# Patient Record
Sex: FEMALE | Race: WHITE | HISPANIC OR LATINO | Employment: OTHER | URBAN - METROPOLITAN AREA
[De-identification: names, ages, dates, MRNs, and addresses within clinical notes are randomized per-mention and may not be internally consistent; named-entity substitution may affect disease eponyms.]

---

## 2019-06-27 ENCOUNTER — OFFICE VISIT (OUTPATIENT)
Dept: PODIATRY | Facility: CLINIC | Age: 84
End: 2019-06-27
Payer: MEDICARE

## 2019-06-27 VITALS
BODY MASS INDEX: 20.66 KG/M2 | WEIGHT: 121 LBS | SYSTOLIC BLOOD PRESSURE: 126 MMHG | DIASTOLIC BLOOD PRESSURE: 74 MMHG | HEART RATE: 83 BPM | HEIGHT: 64 IN

## 2019-06-27 DIAGNOSIS — L84 CORNS: ICD-10-CM

## 2019-06-27 DIAGNOSIS — I73.9 PERIPHERAL VASCULAR DISEASE, UNSPECIFIED (HCC): Primary | ICD-10-CM

## 2019-06-27 PROCEDURE — 11719 TRIM NAIL(S) ANY NUMBER: CPT | Performed by: PODIATRIST

## 2019-06-27 PROCEDURE — 11056 PARNG/CUTG B9 HYPRKR LES 2-4: CPT | Performed by: PODIATRIST

## 2019-06-27 PROCEDURE — RECHECK: Performed by: PODIATRIST

## 2019-10-05 PROBLEM — T14.8XXA SKIN AVULSION: Status: ACTIVE | Noted: 2019-10-05

## 2019-10-18 PROBLEM — T14.8XXA SKIN AVULSION: Status: RESOLVED | Noted: 2019-10-05 | Resolved: 2019-10-18

## 2020-01-10 ENCOUNTER — HOSPITAL ENCOUNTER (EMERGENCY)
Facility: HOSPITAL | Age: 85
Discharge: HOME | End: 2020-01-10
Attending: EMERGENCY MEDICINE | Admitting: EMERGENCY MEDICINE
Payer: MEDICARE

## 2020-01-10 ENCOUNTER — APPOINTMENT (EMERGENCY)
Dept: CT IMAGING | Facility: HOSPITAL | Age: 85
End: 2020-01-10
Payer: MEDICARE

## 2020-01-10 VITALS
SYSTOLIC BLOOD PRESSURE: 126 MMHG | DIASTOLIC BLOOD PRESSURE: 60 MMHG | HEIGHT: 64 IN | HEART RATE: 62 BPM | BODY MASS INDEX: 20.1 KG/M2 | RESPIRATION RATE: 20 BRPM | TEMPERATURE: 98.3 F | WEIGHT: 117.73 LBS | OXYGEN SATURATION: 95 %

## 2020-01-10 DIAGNOSIS — R10.84 GENERALIZED ABDOMINAL PAIN: Primary | ICD-10-CM

## 2020-01-10 DIAGNOSIS — J90 PLEURAL EFFUSION: ICD-10-CM

## 2020-01-10 DIAGNOSIS — R91.8 MASS OF RIGHT LUNG: ICD-10-CM

## 2020-01-10 LAB
ALBUMIN SERPL BCP-MCNC: 2.4 G/DL (ref 3.5–5)
ALP SERPL-CCNC: 98 U/L (ref 46–116)
ALT SERPL W P-5'-P-CCNC: 10 U/L (ref 12–78)
ANION GAP SERPL CALCULATED.3IONS-SCNC: 8 MMOL/L (ref 4–13)
AST SERPL W P-5'-P-CCNC: 42 U/L (ref 5–45)
BACTERIA UR QL AUTO: ABNORMAL /HPF
BASOPHILS # BLD AUTO: 0.03 THOUSANDS/ΜL (ref 0–0.1)
BASOPHILS NFR BLD AUTO: 0 % (ref 0–1)
BILIRUB SERPL-MCNC: 0.54 MG/DL (ref 0.2–1)
BILIRUB UR QL STRIP: NEGATIVE
BUN SERPL-MCNC: 10 MG/DL (ref 5–25)
CALCIUM SERPL-MCNC: 8.2 MG/DL (ref 8.3–10.1)
CHLORIDE SERPL-SCNC: 96 MMOL/L (ref 100–108)
CLARITY UR: CLEAR
CO2 SERPL-SCNC: 25 MMOL/L (ref 21–32)
COLOR UR: YELLOW
CREAT SERPL-MCNC: 0.64 MG/DL (ref 0.6–1.3)
EOSINOPHIL # BLD AUTO: 0.03 THOUSAND/ΜL (ref 0–0.61)
EOSINOPHIL NFR BLD AUTO: 0 % (ref 0–6)
ERYTHROCYTE [DISTWIDTH] IN BLOOD BY AUTOMATED COUNT: 12.5 % (ref 11.6–15.1)
GFR SERPL CREATININE-BSD FRML MDRD: 73 ML/MIN/1.73SQ M
GLUCOSE SERPL-MCNC: 109 MG/DL (ref 65–140)
GLUCOSE UR STRIP-MCNC: NEGATIVE MG/DL
HCT VFR BLD AUTO: 31.8 % (ref 34.8–46.1)
HGB BLD-MCNC: 10.7 G/DL (ref 11.5–15.4)
HGB UR QL STRIP.AUTO: NEGATIVE
IMM GRANULOCYTES # BLD AUTO: 0.04 THOUSAND/UL (ref 0–0.2)
IMM GRANULOCYTES NFR BLD AUTO: 0 % (ref 0–2)
KETONES UR STRIP-MCNC: NEGATIVE MG/DL
LEUKOCYTE ESTERASE UR QL STRIP: ABNORMAL
LIPASE SERPL-CCNC: 89 U/L (ref 73–393)
LYMPHOCYTES # BLD AUTO: 1.73 THOUSANDS/ΜL (ref 0.6–4.47)
LYMPHOCYTES NFR BLD AUTO: 18 % (ref 14–44)
MCH RBC QN AUTO: 30.8 PG (ref 26.8–34.3)
MCHC RBC AUTO-ENTMCNC: 33.6 G/DL (ref 31.4–37.4)
MCV RBC AUTO: 92 FL (ref 82–98)
MONOCYTES # BLD AUTO: 1.31 THOUSAND/ΜL (ref 0.17–1.22)
MONOCYTES NFR BLD AUTO: 13 % (ref 4–12)
NEUTROPHILS # BLD AUTO: 6.7 THOUSANDS/ΜL (ref 1.85–7.62)
NEUTS SEG NFR BLD AUTO: 69 % (ref 43–75)
NITRITE UR QL STRIP: NEGATIVE
NON-SQ EPI CELLS URNS QL MICRO: ABNORMAL /HPF
NRBC BLD AUTO-RTO: 0 /100 WBCS
PH UR STRIP.AUTO: 6 [PH]
PLATELET # BLD AUTO: 350 THOUSANDS/UL (ref 149–390)
PMV BLD AUTO: 9.3 FL (ref 8.9–12.7)
POTASSIUM SERPL-SCNC: 4 MMOL/L (ref 3.5–5.3)
PROT SERPL-MCNC: 7 G/DL (ref 6.4–8.2)
PROT UR STRIP-MCNC: NEGATIVE MG/DL
RBC # BLD AUTO: 3.47 MILLION/UL (ref 3.81–5.12)
RBC #/AREA URNS AUTO: ABNORMAL /HPF
SODIUM SERPL-SCNC: 129 MMOL/L (ref 136–145)
SP GR UR STRIP.AUTO: <=1.005 (ref 1–1.03)
UROBILINOGEN UR QL STRIP.AUTO: 0.2 E.U./DL
WBC # BLD AUTO: 9.84 THOUSAND/UL (ref 4.31–10.16)
WBC #/AREA URNS AUTO: ABNORMAL /HPF

## 2020-01-10 PROCEDURE — 85025 COMPLETE CBC W/AUTO DIFF WBC: CPT | Performed by: EMERGENCY MEDICINE

## 2020-01-10 PROCEDURE — 74177 CT ABD & PELVIS W/CONTRAST: CPT

## 2020-01-10 PROCEDURE — 81001 URINALYSIS AUTO W/SCOPE: CPT | Performed by: EMERGENCY MEDICINE

## 2020-01-10 PROCEDURE — 99284 EMERGENCY DEPT VISIT MOD MDM: CPT | Performed by: EMERGENCY MEDICINE

## 2020-01-10 PROCEDURE — 80053 COMPREHEN METABOLIC PANEL: CPT | Performed by: EMERGENCY MEDICINE

## 2020-01-10 PROCEDURE — 36415 COLL VENOUS BLD VENIPUNCTURE: CPT | Performed by: EMERGENCY MEDICINE

## 2020-01-10 PROCEDURE — 99284 EMERGENCY DEPT VISIT MOD MDM: CPT

## 2020-01-10 PROCEDURE — 83690 ASSAY OF LIPASE: CPT | Performed by: EMERGENCY MEDICINE

## 2020-01-10 RX ORDER — DICYCLOMINE HCL 20 MG
20 TABLET ORAL ONCE
Status: COMPLETED | OUTPATIENT
Start: 2020-01-10 | End: 2020-01-10

## 2020-01-10 RX ORDER — DICYCLOMINE HCL 20 MG
20 TABLET ORAL 2 TIMES DAILY PRN
Qty: 20 TABLET | Refills: 0 | Status: SHIPPED | OUTPATIENT
Start: 2020-01-10

## 2020-01-10 RX ADMIN — IODIXANOL 85 ML: 320 INJECTION, SOLUTION INTRAVASCULAR at 11:14

## 2020-01-10 RX ADMIN — DICYCLOMINE HYDROCHLORIDE 20 MG: 20 TABLET ORAL at 13:24

## 2020-01-10 NOTE — ED PROVIDER NOTES
History  Chief Complaint   Patient presents with    Abdominal Pain     Diffuse abd pain with reports of constipation     Patient is a 27-year-old female with a history of hypertension who presents with abdominal pain  Patient states that she has had constipation for the past week  She has been seen at [facility] emergency department multiple times for similar complaints  She was hospitalized after 1 of these visits for hyponatremia  She has had small, infrequent bowel movements since her visits  She has been taking MiraLax and had received multiple enemas in the emergency department  This morning, she complained of worsened generalized abdominal pain  She told staff at her living facility to arrange transport to the emergency department  Patient's last bowel movement was yesterday and she did experience relief of her abdominal pain after this bowel movement  She denies fever, chills, nausea, vomiting or other complaints  History provided by:  Patient  Abdominal Pain   Pain location:  Generalized  Pain radiates to:  Does not radiate  Pain severity:  Moderate  Duration:  1 week  Timing:  Intermittent  Progression:  Worsening  Chronicity:  New  Ineffective treatments: Bowel activity  Associated symptoms: constipation and flatus    Associated symptoms: no chest pain, no chills, no cough, no diarrhea, no dysuria, no fever, no hematuria, no nausea, no shortness of breath, no sore throat, no vaginal bleeding, no vaginal discharge and no vomiting        Prior to Admission Medications   Prescriptions Last Dose Informant Patient Reported? Taking?    BYSTOLIC 10 MG tablet   Yes No   Sig: TAKE 0 5 TABLET BY ORAL ROUTE EVERY DAY   COMBIGAN 0 2-0 5 %   Yes No   Sig: INSERT 1 DROP OPHTHALMICALLY TWICE A DAY INTO BOTH EYES , BRAND NECESSARY   Ciclopirox 1 % shampoo   Yes No   Sig: WASH TWICE WEEKLY   amLODIPine (NORVASC) 2 5 mg tablet   Yes No   Sig: Take 2 5 mg by mouth daily   aspirin 81 mg chewable tablet   Yes No   Sig: Chew 81 mg   erythromycin (ILOTYCIN) ophthalmic ointment   Yes No   Sig: APPLY A SMALL AMOUNT ON EYELID THREE TIMES A DAY AS DIRECTED   levothyroxine 88 mcg tablet   Yes No   Sig: Take 88 mcg by mouth daily      Facility-Administered Medications: None       Past Medical History:   Diagnosis Date    Cataract     Disease of thyroid gland     Glaucoma     HL (hearing loss)     Wears hearing aids    Hypertension     PVD (peripheral vascular disease) (HCC)     Tinnitus        Past Surgical History:   Procedure Laterality Date    ADENOIDECTOMY      APPENDECTOMY      CATARACT EXTRACTION      CORRECTION HAMMER TOE Left     3rd toe    HYSTERECTOMY      THYROIDECTOMY      TONSILLECTOMY         Family History   Problem Relation Age of Onset    Diabetes Family     Heart disease Family      I have reviewed and agree with the history as documented  Social History     Tobacco Use    Smoking status: Former Smoker    Smokeless tobacco: Never Used   Substance Use Topics    Alcohol use: Yes     Frequency: Monthly or less    Drug use: Never        Review of Systems   Constitutional: Negative for chills, diaphoresis and fever  HENT: Negative for nosebleeds, sore throat and trouble swallowing  Eyes: Negative for photophobia, pain and visual disturbance  Respiratory: Negative for cough, chest tightness and shortness of breath  Cardiovascular: Negative for chest pain, palpitations and leg swelling  Gastrointestinal: Positive for abdominal pain, constipation and flatus  Negative for diarrhea, nausea and vomiting  Endocrine: Negative for polydipsia and polyuria  Genitourinary: Negative for difficulty urinating, dysuria, hematuria, pelvic pain, vaginal bleeding and vaginal discharge  Musculoskeletal: Negative for back pain, neck pain and neck stiffness  Skin: Negative for pallor and rash  Neurological: Negative for dizziness, seizures, light-headedness and headaches     All other systems reviewed and are negative  Physical Exam  Physical Exam   Constitutional: She is oriented to person, place, and time  She appears well-developed and well-nourished  No distress  HENT:   Head: Normocephalic and atraumatic  Mouth/Throat: Oropharynx is clear and moist  Mucous membranes are dry  Eyes: Pupils are equal, round, and reactive to light  EOM are normal    Neck: Normal range of motion  Neck supple  Cardiovascular: Normal rate, regular rhythm, normal heart sounds, intact distal pulses and normal pulses  Pulmonary/Chest: Effort normal and breath sounds normal  No respiratory distress  Abdominal: Soft  She exhibits no distension  There is generalized tenderness  There is no rigidity, no rebound and no guarding  Musculoskeletal: Normal range of motion  She exhibits no edema or tenderness  Lymphadenopathy:     She has no cervical adenopathy  Neurological: She is alert and oriented to person, place, and time  She has normal strength  No cranial nerve deficit or sensory deficit  Skin: Skin is warm and dry  Capillary refill takes less than 2 seconds  Psychiatric: She has a normal mood and affect  Nursing note and vitals reviewed        Vital Signs  ED Triage Vitals [01/10/20 0943]   Temperature Pulse Respirations Blood Pressure SpO2   98 3 °F (36 8 °C) 68 20 (!) 172/72 96 %      Temp Source Heart Rate Source Patient Position - Orthostatic VS BP Location FiO2 (%)   Oral Monitor Sitting Right arm --      Pain Score       Worst Possible Pain           Vitals:    01/10/20 0943 01/10/20 1239   BP: (!) 172/72 126/60   Pulse: 68 62   Patient Position - Orthostatic VS: Sitting Lying         Visual Acuity      ED Medications  Medications   iodixanol (VISIPAQUE) 320 MG/ML injection 85 mL (85 mL Intravenous Given 1/10/20 1114)   dicyclomine (BENTYL) tablet 20 mg (20 mg Oral Given 1/10/20 1324)       Diagnostic Studies  Results Reviewed     Procedure Component Value Units Date/Time    Urine Microscopic [[id number]]  (Abnormal) Collected:  01/10/20 1151    Lab Status:  Final result Specimen:  Urine, Other Updated:  01/10/20 1246     RBC, UA 0-1 /hpf      WBC, UA 1-2 /hpf      Epithelial Cells Occasional /hpf      Bacteria, UA Occasional /hpf     UA (URINE) with reflex to Scope [[id number]]  (Abnormal) Collected:  01/10/20 1151    Lab Status:  Final result Specimen:  Urine, Other Updated:  01/10/20 1206     Color, UA Yellow     Clarity, UA Clear     Specific Gravity, UA <=1 005     pH, UA 6 0     Leukocytes, UA Small     Nitrite, UA Negative     Protein, UA Negative mg/dl      Glucose, UA Negative mg/dl      Ketones, UA Negative mg/dl      Urobilinogen, UA 0 2 E U /dl      Bilirubin, UA Negative     Blood, UA Negative    CMP [[id number]]  (Abnormal) Collected:  01/10/20 1024    Lab Status:  Final result Specimen:  Blood from Arm, Right Updated:  01/10/20 1059     Sodium 129 mmol/L      Potassium 4 0 mmol/L      Chloride 96 mmol/L      CO2 25 mmol/L      ANION GAP 8 mmol/L      BUN 10 mg/dL      Creatinine 0 64 mg/dL      Glucose 109 mg/dL      Calcium 8 2 mg/dL      AST 42 U/L      ALT 10 U/L      Alkaline Phosphatase 98 U/L      Total Protein 7 0 g/dL      Albumin 2 4 g/dL      Total Bilirubin 0 54 mg/dL      eGFR 73 ml/min/1 73sq m     Narrative:       [place] guidelines for Chronic Kidney Disease (CKD):     Stage 1 with normal or high GFR (GFR > 90 mL/min/1 73 square meters)    Stage 2 Mild CKD (GFR = 60-89 mL/min/1 73 square meters)    Stage 3A Moderate CKD (GFR = 45-59 mL/min/1 73 square meters)    Stage 3B Moderate CKD (GFR = 30-44 mL/min/1 73 square meters)    Stage 4 Severe CKD (GFR = 15-29 mL/min/1 73 square meters)    Stage 5 End Stage CKD (GFR <15 mL/min/1 73 square meters)  Note: GFR calculation is accurate only with a steady state creatinine    Lipase [[id number]]  (Normal) Collected:  01/10/20 1024    Lab Status:  Final result Specimen:  Blood from Arm, Right Updated: 01/10/20 1059     Lipase 89 u/L     CBC and differential [[id number]]  (Abnormal) Collected:  01/10/20 1024    Lab Status:  Final result Specimen:  Blood from Arm, Right Updated:  01/10/20 1032     WBC 9 84 Thousand/uL      RBC 3 47 Million/uL      Hemoglobin 10 7 g/dL      Hematocrit 31 8 %      MCV 92 fL      MCH 30 8 pg      MCHC 33 6 g/dL      RDW 12 5 %      MPV 9 3 fL      Platelets 645 Thousands/uL      nRBC 0 /100 WBCs      Neutrophils Relative 69 %      Immat GRANS % 0 %      Lymphocytes Relative 18 %      Monocytes Relative 13 %      Eosinophils Relative 0 %      Basophils Relative 0 %      Neutrophils Absolute 6 70 Thousands/µL      Immature Grans Absolute 0 04 Thousand/uL      Lymphocytes Absolute 1 73 Thousands/µL      Monocytes Absolute 1 31 Thousand/µL      Eosinophils Absolute 0 03 Thousand/µL      Basophils Absolute 0 03 Thousands/µL                  CT abdomen pelvis with contrast   Final Result by Rachel Julio MD (01/10 1201)   1  No acute findings in the abdomen or pelvis  2   Small right pleural effusion  Trace left pleural effusion  3  Focal rounded consolidation in the right middle lobe inferiorly  Question rounded atelectasis or pneumonia, pulmonary lesion is not excluded  Based on current Fleischner Society 2017 Guidelines on incidental pulmonary nodule, either PET/CT scan    evaluation, tissue sampling or short term interval followup non-contrast CT followup (initially in 3 months) may be considered appropriate  4   Moderate anterior wedging at T11 of indeterminate age  The study was marked in EPIC for significant notification         Workstation performed: [id number]                    Procedures  Procedures         ED Course                               MDM  Number of Diagnoses or Management Options  Generalized abdominal pain: new and requires workup  Mass of right lung: new and requires workup  Pleural effusion: new and requires workup  Diagnosis management comments: Patient presents with abdominal pain and constipation  Patient has been evaluated multiple times at outside hospitals for similar complaints  CT today shows no acute intra-abdominal pathology  Does reveal a right lung mass which she is aware of  This was recently diagnosed and she is supposed to follow-up with her physician for further workup  Patient received time with relief of pain  It seems to be crampy in nature  Therefore will prescribe Bentyl to use as needed  Patient will continue to use stool softener  Do not suspect acute surgical process including but not limited to acute cholecystitis, acute appendicitis, SBO, mesenteric ischemia, AAA, vascular dissection, vascular occlusion, perforated viscus  Do not suspect intrathoracic cause of abdominal pain  Symptoms improved and patient is tolerating po  Patient advised to return to ED if symptoms worsen or persist  Patient also advised to follow up with PCP         Amount and/or Complexity of Data Reviewed  Clinical lab tests: reviewed and ordered  Tests in the radiology section of CPT®: ordered and reviewed  Tests in the medicine section of CPT®: ordered and reviewed  Review and summarize past medical records: yes  Independent visualization of images, tracings, or specimens: yes    Risk of Complications, Morbidity, and/or Mortality  Presenting problems: high  Diagnostic procedures: high  Management options: moderate    Patient Progress  Patient progress: stable        Disposition  Final diagnoses:   Generalized abdominal pain   Mass of right lung   Pleural effusion     Time reflects when diagnosis was documented in both MDM as applicable and the Disposition within this note     Time User Action Codes Description Comment    1/10/2020  1:03 PM Amelia WEBSTER Add [R10 84] Generalized abdominal pain     1/10/2020  1:04 PM Susan Vasquez Add [R91 8] Mass of right lung     1/10/2020  1:04 PM Amelia WEBSTER Add [J90] Pleural effusion       ED Disposition     ED Disposition Condition Date/Time Comment    Discharge Stable Fri Lakhwinder 10, 2020  1:03 PM Lauryn Diehl discharge to home/self care  Follow-up Information     Follow up With Specialties Details Why Contact Info    Dionicio Negrete MD Internal Medicine Schedule an appointment as soon as possible for a visit  Return to ED sooner if symptoms worsen or persist  [address] [facility]  [address] [place]  [phone]            Patient's Medications   Discharge Prescriptions    DICYCLOMINE (BENTYL) 20 MG TABLET    Take 1 tablet (20 mg total) by mouth 2 (two) times a day as needed (Abdominal cramping)       Start Date: 1/10/2020 End Date: --       Order Dose: 20 mg       Quantity: 20 tablet    Refills: 0     No discharge procedures on file      ED Provider  Electronically Signed by           Lakia Rodriguez DO  01/10/20 1500

## 2020-01-18 ENCOUNTER — TELEPHONE (OUTPATIENT)
Dept: OTHER | Facility: OTHER | Age: 85
End: 2020-01-18

## 2020-01-18 NOTE — TELEPHONE ENCOUNTER
Kezia Overton from [facility]/ [phone] Ext: 221/ PT: [employer]/ : 1918/ New  admission orders/ [place] Text PT'S information @8973/ advised caller to call back in 30 minutes if the DR  doesn't contact her back

## 2020-01-20 ENCOUNTER — NURSING HOME VISIT (OUTPATIENT)
Dept: GERIATRICS | Facility: OTHER | Age: 85
End: 2020-01-20
Payer: MEDICARE

## 2020-01-20 DIAGNOSIS — Z16.12 UTI DUE TO EXTENDED-SPECTRUM BETA LACTAMASE (ESBL) PRODUCING ESCHERICHIA COLI: ICD-10-CM

## 2020-01-20 DIAGNOSIS — E03.9 HYPOTHYROIDISM, UNSPECIFIED TYPE: ICD-10-CM

## 2020-01-20 DIAGNOSIS — S22.081D CLOSED STABLE BURST FRACTURE OF ELEVENTH THORACIC VERTEBRA WITH ROUTINE HEALING, SUBSEQUENT ENCOUNTER: ICD-10-CM

## 2020-01-20 DIAGNOSIS — G47.00 INSOMNIA, UNSPECIFIED TYPE: ICD-10-CM

## 2020-01-20 DIAGNOSIS — N39.0 UTI DUE TO EXTENDED-SPECTRUM BETA LACTAMASE (ESBL) PRODUCING ESCHERICHIA COLI: ICD-10-CM

## 2020-01-20 DIAGNOSIS — I10 BENIGN ESSENTIAL HYPERTENSION: ICD-10-CM

## 2020-01-20 DIAGNOSIS — M80.00XD AGE-RELATED OSTEOPOROSIS WITH CURRENT PATHOLOGICAL FRACTURE WITH ROUTINE HEALING: ICD-10-CM

## 2020-01-20 DIAGNOSIS — K59.00 CONSTIPATION, UNSPECIFIED CONSTIPATION TYPE: ICD-10-CM

## 2020-01-20 DIAGNOSIS — B96.29 UTI DUE TO EXTENDED-SPECTRUM BETA LACTAMASE (ESBL) PRODUCING ESCHERICHIA COLI: ICD-10-CM

## 2020-01-20 DIAGNOSIS — G89.11 ACUTE PAIN DUE TO TRAUMA: ICD-10-CM

## 2020-01-20 DIAGNOSIS — D72.829 LEUKOCYTOSIS, UNSPECIFIED TYPE: ICD-10-CM

## 2020-01-20 DIAGNOSIS — E87.1 HYPONATREMIA: Primary | ICD-10-CM

## 2020-01-20 DIAGNOSIS — Z71.89 GOALS OF CARE, COUNSELING/DISCUSSION: ICD-10-CM

## 2020-01-20 DIAGNOSIS — I49.5 SSS (SICK SINUS SYNDROME) (HCC): ICD-10-CM

## 2020-01-20 DIAGNOSIS — R91.8 MASS OF RIGHT LUNG: ICD-10-CM

## 2020-01-20 DIAGNOSIS — R53.81 PHYSICAL DECONDITIONING: ICD-10-CM

## 2020-01-20 PROCEDURE — 99306 1ST NF CARE HIGH MDM 50: CPT | Performed by: FAMILY MEDICINE

## 2020-01-20 NOTE — PROGRESS NOTES
[facility]   [address] [facility], [place], [address]  History and Physical  POS: SNF- 31    Records Reviewed include: [facility] records  Unable to obtain from patient due to: N/A; history obtained from patient along with record review    Chief Complaint/ Reason for Admission: Hyponatremia, lung torin, constipation, deconditioning, thoracic fracture, fall    History of Present Illness:            8 year old female admitted for SNF rehab following hospitalization at [facility]; multiple recent hospital visits since a recent fall while visiting her son in [place]  Inpatient workup revealed significant for acute T11 burst fracture; patient was seen by neurosurgery with recommendation for nonoperative management; no need for bracing per record review; pain control  At time of admission exam on 1/20, patient complains that her left ribs are painful with radiation to her back; no cough or SOB; took PRN tramadol for pain which was helpful  Lab workup significant for hyponatremia, Na level 126  Was seen in consultation by nephrology; treated with fluid restriction (currently 1L/day) and tolvaptan x1; Na 134 when last checked inpatient  Patient notes significant dry mouth on fluid restriction  Imaging revealed a right lung mass suspicious for malignancy; per hospital notes, patient declined further workup of this mass  Additional workup included urine culture +ESBL; seen by ID and completed course of antibiotics inpatient  Continues on amlodipine and bystolic for hypertension; SBP trending 110s-170s since SNF admission  Pacemaker in place for SSS, HR trending 60s  Reviewed goals of care with patient; she hope to return back to her [address] [place] [address] following completion of SNF rehab  In the event of cardiopulmonary arrest, patient elects for a natural death rather than CPR; code status order changed to DNR/DNI while at [facility]         Allergies  No Known Allergies    Past Medical History  Past Medical History:   Diagnosis Date    Cataract     Disease of thyroid gland     Glaucoma     HL (hearing loss)     Wears hearing aids    Hypertension     PVD (peripheral vascular disease) (HCC)     Tinnitus       CHF baseline EF:N/A  CKD: baseline creatinine 0 6-0 7    Past Surgical History:   Procedure Laterality Date    ADENOIDECTOMY      APPENDECTOMY      CATARACT EXTRACTION      CORRECTION HAMMER TOE Left     3rd toe    HYSTERECTOMY      THYROIDECTOMY      TONSILLECTOMY         Family History  Family History   Problem Relation Age of Onset    Diabetes Family     Heart disease Family        Social History  Social History     Tobacco Use   Smoking Status Former Smoker   Smokeless Tobacco Never Used      Social History     Substance and Sexual Activity   Alcohol Use Yes    Frequency: Monthly or less      Social History     Substance and Sexual Activity   Drug Use Never        Lives: Home, [address]  Social Support: Family  Education Level:  Occupation:  Fall in the past 12 months: Yes  Use of assistance Device: None    Physical Exam    Weight: 109 4lb Temp:97 9F BP:154/75 (116//80) Pulse:61 Resp:14 O2 Sat:92% RA  Constitutional: Normocephalic and Pallor  Orientation:Person, Place, Month and Year, situation     Physical Exam   Constitutional: She is oriented to person, place, and time  She appears cachectic  She is cooperative  No distress  HENT:   Head: Normocephalic and atraumatic  Mouth/Throat: No oropharyngeal exudate  Lips dry   Eyes: Conjunctivae are normal  Right eye exhibits no discharge  Left eye exhibits no discharge  No scleral icterus  Neck: Neck supple  Cardiovascular: Normal rate and regular rhythm  Pulmonary/Chest: Effort normal    Decreased breath sounds bilaterally   Abdominal: Soft  Bowel sounds are normal    Musculoskeletal: She exhibits no edema  Neurological: She is alert and oriented to person, place, and time  No cranial nerve deficit  Skin: Skin is warm and dry  She is not diaphoretic  There is pallor  Psychiatric: She has a normal mood and affect  Her behavior is normal  Judgment and thought content normal    Nursing note and vitals reviewed  Review of Systems:  Review of Systems   Constitutional: Positive for unexpected weight change  Negative for activity change, appetite change, chills and fever  HENT: Negative for trouble swallowing  Eyes: Negative for visual disturbance  Respiratory: Negative for cough and shortness of breath  Left rib pain   Cardiovascular: Negative for chest pain, palpitations and leg swelling  Gastrointestinal: Negative for abdominal pain, nausea and vomiting  Genitourinary: Negative for decreased urine volume and difficulty urinating  Musculoskeletal: Positive for arthralgias and back pain  Skin: Negative for rash  Neurological: Positive for weakness (generalized)  Negative for dizziness and light-headedness  Psychiatric/Behavioral: Positive for sleep disturbance  Negative for confusion  All other systems reviewed and are negative        List of Current Medications:  PRN: acetaminophen, MoM, dulcolax, fleet enema, maalox, bisacodyl, dicyclomine, ventolin, tramadol    Amlodipine 5mg daily  Aspirin 81mg daily  Bcomplex Plus Vitamin C daily  Biotin 5mg daily  Bystolic 5mg daily  Calcium + Vitamin D daily  Combigan 0 2%-0 5% one drop both eyes BID  Lasix 20mg weekly  Levothyroxine 88mcg daily  MVI daily  Miralax 17g daily  Vitamin D3 2000 IU daily    Fluid restriction 1L      Allergies  No Known Allergies    Labs/Diagnostics (reviewed by this provider): Hospital Paperwork and Old Records  TSH:1 50  CBC: Hb: 13 1 Hct:39 0 WBC:10 2 (11 6) PLT:385  CMP: Na:134 (126) K:4 3 Cl:98 CO:30 BUN:11 Cr:0 57 Glu:101 Ca:9 0 AST:28 ALT:11  Alk-P:102 Tprotein:7 6 Albumin:2 7 Tbili:0 8     Microbiology:  UA: 5-19 ketones, 500 leuks, nitrite postive  Urine Cx: (1/15/20) >100,000 ESBL    Imaging Reviewed:  CT Scan: CT Abd/Pelvis (1/12/20)- no acute intra-abdominal prcoess; 2 0x1 9cm RLL nodule; small right pleural effusion  Thoracic/Lumbar spine (1/11/20)- acute burst fracture of T11 with mild bony retropulsion without significant encroachment on central canal; healed L1 and L3 transverse process fractures; ununited left L2 transverse process fracture; levoscoliosis    Assessment/Plan:  8 year old female with:    Hyponatremia  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  Currently on 1L/day fluid restriction  BMP ordered for 1/21    Closed T11 fracture (HCC)  S/p fall  Evaluated by neurosurgery inpatient; nonoperative management, no bracing ordered  Pain control and treatment of osteoporosis as outlined     Mass of right lung  Per hospital records, patient declined further workup  Suspected malignancy based on CT imaging appearance and in setting of hyponatremia    Acute pain due to trauma  In setting of falls; acute T11 fracture  Order Aspercreme 4% lidocaine patch to left painful ribs daily- on 12h off 12h  Order Acetaminophen 650mg TID; max 3g/24h  Continue PRN tramadol for severe pain- consider changing to OxyIR if pain not well controlled    Constipation  Continue daily miralax  Encourage PO intake- currently on fluid restriction 2/2 hyponatremia; follow up lab results, liberalize fluid intake as able    Hypothyroid  Continue levothyroxine 88mcg daily  TSH WNL when recently checked    Age-related osteoporosis with current pathological fracture with routine healing  Continue calcium and Vitamin D supplementation    SSS (sick sinus syndrome) ([address] )  With pacemaker in place    Benign essential hypertension  Goal <150/90  Exacerbated in setting of uncontrolled pain- see management as outlined  Continue amlodipine, bystolic  Also on lasix once weekly- in setting of poor PO intake, can likely discontinue    Physical deconditioning  Multifactorial  Admit to SNF for rehab  PT/OT consult- evaluate and treat  Supportive care, nutritional support, ADL support  Fall precautions    Goals of care, counseling/discussion  See discussion as per HPI  Code status changed to DNR/DNI while at SNF    Recurrent falls  Fall precautions  PT/OT consult  Continue calcium and Vitamin D supplementation    Insomnia  Sleep hygiene  Pain control as outlined  Add melatonin 3mg QHS    UTI due to extended-spectrum beta lactamase (ESBL) producing Escherichia coli  Completed course of antibiotics inpatient    Leukocytosis  CBC w/diff ordered for 1/21      Pain: Present yes- see HPI  Rehab Potential:Fair  Patient Informed of Medical Condition: yes  Patient is Capable of Understanding Their Right: yes  Prognosis:Guarded  Discharge Plan: STR-> [facility] vs LTC  Surrogate Decision Maker: Colby  Advanced Directives: Yes   Code status:DNR (Per discussion with resident or POA)  PCP: Niya Garcia MD    Immunization History   Administered Date(s) Administered    INFLUENZA 02/19/2013, 11/06/2014, 10/17/2016, 10/04/2018    Pneumococcal Conjugate 13-Valent 04/18/2018, 04/27/2018       Mamadou Holder DO  1/20/20

## 2020-01-21 PROBLEM — B96.29 UTI DUE TO EXTENDED-SPECTRUM BETA LACTAMASE (ESBL) PRODUCING ESCHERICHIA COLI: Status: ACTIVE | Noted: 2020-01-21

## 2020-01-21 PROBLEM — E87.1 HYPONATREMIA: Status: ACTIVE | Noted: 2020-01-08

## 2020-01-21 PROBLEM — G89.11 ACUTE PAIN DUE TO TRAUMA: Status: ACTIVE | Noted: 2020-01-21

## 2020-01-21 PROBLEM — N39.0 UTI DUE TO EXTENDED-SPECTRUM BETA LACTAMASE (ESBL) PRODUCING ESCHERICHIA COLI: Status: ACTIVE | Noted: 2020-01-21

## 2020-01-21 PROBLEM — R53.81 PHYSICAL DECONDITIONING: Status: ACTIVE | Noted: 2020-01-21

## 2020-01-21 PROBLEM — K59.00 CONSTIPATION: Status: ACTIVE | Noted: 2020-01-08

## 2020-01-21 PROBLEM — S22.089A CLOSED T11 FRACTURE (HCC): Status: ACTIVE | Noted: 2020-01-11

## 2020-01-21 PROBLEM — R33.9 URINARY RETENTION: Status: ACTIVE | Noted: 2020-01-12

## 2020-01-21 PROBLEM — Z16.12 UTI DUE TO EXTENDED-SPECTRUM BETA LACTAMASE (ESBL) PRODUCING ESCHERICHIA COLI: Status: ACTIVE | Noted: 2020-01-21

## 2020-01-21 PROBLEM — Z71.89 GOALS OF CARE, COUNSELING/DISCUSSION: Status: ACTIVE | Noted: 2020-01-21

## 2020-01-21 PROBLEM — D72.829 LEUKOCYTOSIS: Status: ACTIVE | Noted: 2020-01-21

## 2020-01-21 PROBLEM — R91.8 MASS OF RIGHT LUNG: Status: ACTIVE | Noted: 2020-01-08

## 2020-01-21 PROBLEM — M80.00XD AGE-RELATED OSTEOPOROSIS WITH CURRENT PATHOLOGICAL FRACTURE WITH ROUTINE HEALING: Status: ACTIVE | Noted: 2020-01-21

## 2020-01-21 PROBLEM — R29.6 RECURRENT FALLS: Status: ACTIVE | Noted: 2020-01-21

## 2020-01-21 PROBLEM — G47.00 INSOMNIA: Status: ACTIVE | Noted: 2020-01-21

## 2020-01-21 NOTE — ASSESSMENT & PLAN NOTE
Continue daily miralax  Encourage PO intake- currently on fluid restriction 2/2 hyponatremia; follow up lab results, liberalize fluid intake as able

## 2020-01-21 NOTE — ASSESSMENT & PLAN NOTE
Goal <150/90  Exacerbated in setting of uncontrolled pain- see management as outlined  Continue amlodipine, bystolic  Also on lasix once weekly- in setting of poor PO intake, can likely discontinue

## 2020-01-21 NOTE — ASSESSMENT & PLAN NOTE
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

## 2020-01-21 NOTE — ASSESSMENT & PLAN NOTE
Per hospital records, patient declined further workup  Suspected malignancy based on CT imaging appearance and in setting of hyponatremia

## 2020-01-21 NOTE — ASSESSMENT & PLAN NOTE
In setting of falls; acute T11 fracture  Order Aspercreme 4% lidocaine patch to left painful ribs daily- on 12h off 12h  Order Acetaminophen 650mg TID; max 3g/24h  Continue PRN tramadol for severe pain- consider changing to OxyIR if pain not well controlled

## 2020-01-21 NOTE — ASSESSMENT & PLAN NOTE
S/p fall  Evaluated by neurosurgery inpatient; nonoperative management, no bracing ordered  Pain control and treatment of osteoporosis as outlined

## 2020-01-22 ENCOUNTER — NURSING HOME VISIT (OUTPATIENT)
Dept: GERIATRICS | Facility: OTHER | Age: 85
End: 2020-01-22
Payer: MEDICARE

## 2020-01-22 DIAGNOSIS — R53.81 DEBILITY: ICD-10-CM

## 2020-01-22 DIAGNOSIS — I10 BENIGN ESSENTIAL HYPERTENSION: ICD-10-CM

## 2020-01-22 DIAGNOSIS — M80.00XD AGE-RELATED OSTEOPOROSIS WITH CURRENT PATHOLOGICAL FRACTURE WITH ROUTINE HEALING: ICD-10-CM

## 2020-01-22 DIAGNOSIS — G89.11 ACUTE PAIN DUE TO TRAUMA: ICD-10-CM

## 2020-01-22 DIAGNOSIS — R53.81 PHYSICAL DECONDITIONING: ICD-10-CM

## 2020-01-22 DIAGNOSIS — S22.081D CLOSED STABLE BURST FRACTURE OF ELEVENTH THORACIC VERTEBRA WITH ROUTINE HEALING, SUBSEQUENT ENCOUNTER: Primary | ICD-10-CM

## 2020-01-22 DIAGNOSIS — R26.2 AMBULATORY DYSFUNCTION: ICD-10-CM

## 2020-01-22 PROCEDURE — 99309 SBSQ NF CARE MODERATE MDM 30: CPT | Performed by: NURSE PRACTITIONER

## 2020-01-22 NOTE — PROGRESS NOTES
[facility] CHILDREN   [address] [place] [address], [place], [address]  SNF-31  STR Note    Chief Complaint/Reason for visit: STR Follow up- closed T11 fracture; acute pain due to trauma; age-related osteoporosis with current pathological fracture with routine healing; benign essential hypertension; physical deconditioning; debility; recurrent falls; ambulatory dysfunction  History of Present Illness: 8-year-old female seen and examined for follow-up care in collaboration with nursing for the update in treatment plan  Patient was in bed at time of visit and appeared in distress due to severe back pain unrelieved by current pain medication regimen  Has poor appetite due to pain  Nursing has been in contact with neuro surgery regarding back brace  Notes from hospital stated that patient may use back brace if she has pain  Denies shortness of breath, chest pain, headache, lightheadedness, dizziness, lightheadedness, abdominal pain, nausea, vomiting, diarrhea, or constipation  Past Medical History: unchanged from history and physical  Past Medical History:   Diagnosis Date    Cataract     Disease of thyroid gland     Glaucoma     HL (hearing loss)     Wears hearing aids    Hypertension     PVD (peripheral vascular disease) ([facility] [address] )     Tinnitus      Family History: unchanged from history and physical  Social History: unchanged from history and physical  Resident Since:  01/18/2020  Review of systems: Review of Systems   Constitutional: Positive for activity change, appetite change and fatigue  Negative for chills and diaphoresis  HENT:        Dry mouth and lips  Eyes: Negative  Respiratory: Negative  Cardiovascular: Negative  Gastrointestinal: Negative  Genitourinary: Negative  Musculoskeletal: Positive for back pain and gait problem  Left rib pain  Neurological: Negative for dizziness, syncope, light-headedness and headaches  Medications: All medication orders were reviewed at facility EMR  Changes made- see written orders  Allergies: Reviewed and unchanged  Consults reviewed:PT, OT and Other  Labs/Diagnostics (reviewed by this provider): Copy in Chart     Imaging Reviewed:  Hospital imaging    Physical Exam    Weight:  Temp:  98 5    BP:  105/63 Pulse:  60  Resp:  16   O2 Sat:  94% on room air  Constitutional: Cachetic and Normocephalic  Orientation:Person, Place and Day     Physical Exam   Constitutional: She is oriented to person, place, and time  She appears well-developed  Distressed over current condition, pain, debility   HENT:   Head: Normocephalic and atraumatic  Lips and tongue are dry  Eyes: Pupils are equal, round, and reactive to light  EOM are normal  Right eye exhibits no discharge  Left eye exhibits no discharge  Neck: Neck supple  No JVD present  Cardiovascular: Normal rate, regular rhythm and normal heart sounds  Pulmonary/Chest: Effort normal and breath sounds normal  No respiratory distress  She has no wheezes  She has no rales  Decreased breath sounds bibasilar  Abdominal: Soft  Bowel sounds are normal  She exhibits no distension  There is no tenderness  There is no guarding  Musculoskeletal: She exhibits no edema  Lymphadenopathy:     She has no cervical adenopathy  Neurological: She is alert and oriented to person, place, and time  Skin: Skin is warm and dry  Capillary refill takes less than 2 seconds  She is not diaphoretic  Psychiatric: Her behavior is normal  Thought content normal    Anxious   Nursing note and vitals reviewed      Assessment/Plan:  8-year-old female with:    Closed T11 fracture  -status post fall and was evaluated by Neurosurgery inpatient  -non operative management  -due to complain of severe pain, patient will need bracing  -CBC with diff stable on 01/21/2020  -follow up with neurosurgery as needed    Acute pain due to trauma  -back pain unrelieved with Ultram and scheduled acetaminophen  -discontinue Ultram  -order Neurontin 100 mg q h s ; hold for lethargy or confusion  -order oxycodone IR 2 5 mg q a m  Prior to PT; hold for confusion or lethargy  -order oxycodone IR 2 5 mg q 4 hours p r n  For severe pain; hold for confusion or lethargy  -continue scheduled acetaminophen as ordered  -continue Aspercreme lidocaine patch to painful ribs daily  -continue MiraLax daily to prevent constipation and p r n  Laxatives    Age-related osteoporosis with current pathological fracture with routine healing  -management as above  -continue calcium vitamin-D supplementation  -supportive care  -fall prevention    Benign essential hypertension  -BP's have been at goal until today with hypotension due to poor fluid intake secondary to pain  Encourage p o  Fluids  -add hold parameter to amlodipine and Bystolic  -avoid hypotension  -at hold parameter to Lasix; hold if SBP less than 105  -BMP stable on 01/21/2020    Physical deconditioning/debility/recurrent falls/ambulatory dysfunction  -in setting of closed T11 fracture, rib fracture, acute pain due to trauma  -continue care and support at SNF for ADLs  -continue PT/OT  -continue fall precautions  -provide nutritional support    **Please note: This follow-up note was created using a voice recognition system  [address] [place], [place]  6/35/10952:57 PM

## 2020-01-28 ENCOUNTER — NURSING HOME VISIT (OUTPATIENT)
Dept: GERIATRICS | Facility: OTHER | Age: 85
End: 2020-01-28
Payer: MEDICARE

## 2020-01-28 DIAGNOSIS — G89.11 ACUTE PAIN DUE TO TRAUMA: ICD-10-CM

## 2020-01-28 DIAGNOSIS — R26.2 AMBULATORY DYSFUNCTION: ICD-10-CM

## 2020-01-28 DIAGNOSIS — S22.081D CLOSED STABLE BURST FRACTURE OF ELEVENTH THORACIC VERTEBRA WITH ROUTINE HEALING, SUBSEQUENT ENCOUNTER: Primary | ICD-10-CM

## 2020-01-28 DIAGNOSIS — R53.81 PHYSICAL DECONDITIONING: ICD-10-CM

## 2020-01-28 DIAGNOSIS — M80.00XD AGE-RELATED OSTEOPOROSIS WITH CURRENT PATHOLOGICAL FRACTURE WITH ROUTINE HEALING: ICD-10-CM

## 2020-01-28 DIAGNOSIS — I10 BENIGN ESSENTIAL HYPERTENSION: ICD-10-CM

## 2020-01-28 DIAGNOSIS — R53.81 DEBILITY: ICD-10-CM

## 2020-01-28 DIAGNOSIS — K59.00 CONSTIPATION, UNSPECIFIED CONSTIPATION TYPE: ICD-10-CM

## 2020-01-28 PROCEDURE — 99309 SBSQ NF CARE MODERATE MDM 30: CPT | Performed by: NURSE PRACTITIONER

## 2020-01-28 NOTE — PROGRESS NOTES
[facility] CHILDREN   [address] [place] [address], [place], [address]  SNF-31  STR Note    Chief Complaint/Reason for visit: STR Follow up-closed T11 fracture; acute pain due to trauma; age-related osteoporosis with current pathological fracture with routine healing; hypertension; constipation; physical deconditioning; debility; ambulatory dysfunction  History of Present Illness: 8-year-old female seen and examined for follow-up care  Patient had just ambulated from bathroom with assist of 1  Patient appears comfortable  She states that her pain is better controlled with current medication regimen  Able to participate in therapy  She stated that she is sleeping well  Appetite is fair  Will order weekly weights  Denies shortness of breath, chest pain, headache, dizziness, lightheadedness, abdominal pain, nausea, vomiting, and diarrhea  C/o constipation  Past Medical History: unchanged from history and physical  Past Medical History:   Diagnosis Date    Cataract     Disease of thyroid gland     Glaucoma     HL (hearing loss)     Wears hearing aids    Hypertension     PVD (peripheral vascular disease) ([facility] [address] )     Tinnitus      Family History: unchanged from history and physical  Social History: unchanged from history and physical  Resident Since:  01/18/2020  Review of systems: Review of Systems   Constitutional: Positive for fatigue  Negative for chills  Appetite is fair  Fatigues easily with exercise  HENT: Negative  Eyes: Negative  Respiratory: Negative  Cardiovascular: Negative  Negative for chest pain and palpitations  Gastrointestinal: Negative  Genitourinary: Negative  Musculoskeletal: Positive for arthralgias and gait problem  Skin: Negative  Neurological: Negative for dizziness, syncope, speech difficulty, light-headedness and headaches  Psychiatric/Behavioral: Negative for agitation, behavioral problems and confusion     All other systems reviewed and are negative  Medications: All medication orders were reviewed at [facility] EMR  Changes made- see written orders  Allergies: Reviewed and unchanged  Consults reviewed:PT, OT and Other  Labs/Diagnostics (reviewed by this provider): Copy in Chart    Imaging Reviewed:  None today    Physical Exam    Weight:  109 4 lb Temp:  96 9 BP:  133/60 Pulse:  61 Resp:  18 O2 Sat:  90% on room air  Constitutional: Normocephalic  Orientation:Person, Place and Day     Physical Exam   Constitutional: She is oriented to person, place, and time  She appears well-developed  No distress  HENT:   Head: Normocephalic and atraumatic  Mouth/Throat: Oropharynx is clear and moist  No oropharyngeal exudate  Eyes: Pupils are equal, round, and reactive to light  Conjunctivae and EOM are normal    Neck: Neck supple  No JVD present  Cardiovascular: Normal rate, regular rhythm and normal heart sounds  Pulmonary/Chest: Effort normal and breath sounds normal  No respiratory distress  She has no wheezes  She has no rales  Abdominal: Soft  Bowel sounds are normal  She exhibits no distension  There is no tenderness  There is no guarding  Musculoskeletal: She exhibits no edema  Able to move all extremities  Lymphadenopathy:     She has no cervical adenopathy  Neurological: She is alert and oriented to person, place, and time  Skin: Skin is warm and dry  She is not diaphoretic  Psychiatric: She has a normal mood and affect  Her behavior is normal  Thought content normal    Nursing note and vitals reviewed  Assessment/Plan:  8-year-old female with:    Closed T11 fracture  -status post fall and was evaluated by Neurosurgery inpatient  -non operative management  -LSO brace when out of bed  -follow-up with Neurosurgery    Acute pain due to trauma  -pain with better control on oxycodone IR 2 5 mg q a m   Prior to therapy and p r n , Neurontin 100 mg q h s   -continue scheduled acetaminophen  -continue Aspercreme lidocaine patch  -no change in mental status on oxycodone IR    Age-related osteoporosis with current pathological fracture with routine healing  -management as above  -continue calcium and vitamin-D supplementation  -fall prevention    Hypertension  -SBPs trending 120s-130s  -consider discontinuing amlodipine to avoid hypotension  -patient is also on Lasix 20 mg 1 time weekly    Constipation  -patient stated that she has not had a bowel movement for couple days and had 1 small 1 today   -continue MiraLax daily  -add Colace 100 mg p o  B i d  Physical deconditioning/debility/ambulatory dysfunction  -in setting of above  -patient ambulated using walker in room from bed to bathroom and back to bed this a m  with assist of 1  -continue care and support at SNF for ADLs  -continue PT/OT  -continue fall precautions  -provide nutritional support    **Please note: This follow-up note was constructed using a voice recognition system  [address] [place], [facility]  9/05/[id number]:38 PM

## 2020-01-31 ENCOUNTER — NURSING HOME VISIT (OUTPATIENT)
Dept: GERIATRICS | Facility: OTHER | Age: 85
End: 2020-01-31
Payer: MEDICARE

## 2020-01-31 DIAGNOSIS — I10 BENIGN ESSENTIAL HYPERTENSION: ICD-10-CM

## 2020-01-31 DIAGNOSIS — R53.81 DEBILITY: ICD-10-CM

## 2020-01-31 DIAGNOSIS — R54 FRAILTY SYNDROME IN GERIATRIC PATIENT: ICD-10-CM

## 2020-01-31 DIAGNOSIS — R26.2 AMBULATORY DYSFUNCTION: ICD-10-CM

## 2020-01-31 DIAGNOSIS — R53.81 PHYSICAL DECONDITIONING: ICD-10-CM

## 2020-01-31 DIAGNOSIS — M80.00XD AGE-RELATED OSTEOPOROSIS WITH CURRENT PATHOLOGICAL FRACTURE WITH ROUTINE HEALING: ICD-10-CM

## 2020-01-31 DIAGNOSIS — S22.081D CLOSED STABLE BURST FRACTURE OF ELEVENTH THORACIC VERTEBRA WITH ROUTINE HEALING, SUBSEQUENT ENCOUNTER: Primary | ICD-10-CM

## 2020-01-31 DIAGNOSIS — G89.11 ACUTE PAIN DUE TO TRAUMA: ICD-10-CM

## 2020-01-31 PROCEDURE — 99309 SBSQ NF CARE MODERATE MDM 30: CPT | Performed by: NURSE PRACTITIONER

## 2020-01-31 NOTE — PROGRESS NOTES
[facility] CHILDREN   [address] [facility], [facility], [address]  SNF-31  STR Note    Chief Complaint/Reason for visit: STR Follow up- closed T11 fracture; acute pain due to trauma; age-related osteoporosis with current pathological fracture with routine healing; hypertension; physical deconditioning; debility; ambulatory dysfunction; frailty  History of Present Illness: 8-year-old female seen and examined for follow-up care  Patient was resting in bed in supine position at 30° and appeared comfortable  Admits to having no pain when resting in bed  Participating in therapy  Denies shortness of breath, chest pain, headache, dizziness, lightheadedness, abdominal pain, nausea, vomiting, diarrhea, or constipation  Sleeping well at night per patient  Past Medical History: unchanged from history and physical  Past Medical History:   Diagnosis Date    Cataract     Disease of thyroid gland     Glaucoma     HL (hearing loss)     Wears hearing aids    Hypertension     PVD (peripheral vascular disease) ([facility] [address] )     Tinnitus      Family History: unchanged from history and physical  Social History: unchanged from history and physical  Resident Since:  01/18/2020  Review of systems: Review of Systems   Constitutional: Positive for activity change  Negative for appetite change, chills and diaphoresis  HENT: Negative  Hard of hearing  Eyes: Negative  Respiratory: Negative  Cardiovascular: Negative for chest pain  Gastrointestinal: Negative  Genitourinary: Negative  Musculoskeletal: Positive for arthralgias, back pain and gait problem  Neurological: Negative for dizziness, syncope, speech difficulty, light-headedness, numbness and headaches  Psychiatric/Behavioral: Negative  Medications: All medication orders were reviewed at facility EMR    Changes made- see written orders  Allergies: Reviewed and unchanged  Consults reviewed:PT and OT  Labs/Diagnostics (reviewed by this provider): Copy in Chart    Imaging Reviewed:  None today    Physical Exam    Weight:  Temp:  97 8    BP:  112/62 Pulse:  65 Resp:  16 O2 Sat:  92% on room air  Constitutional: Normocephalic  Orientation:Person, Place and Day     Physical Exam   Constitutional: She is oriented to person, place, and time  She appears well-developed  No distress  HENT:   Head: Normocephalic and atraumatic  Mouth/Throat: Oropharynx is clear and moist    Dry lips  Eyes: Pupils are equal, round, and reactive to light  Conjunctivae and EOM are normal  Right eye exhibits no discharge  Left eye exhibits no discharge  Neck: Neck supple  No JVD present  Cardiovascular: Normal rate, regular rhythm and normal heart sounds  Pulmonary/Chest: Effort normal and breath sounds normal  No respiratory distress  She has no wheezes  She has no rales  Abdominal: Soft  Bowel sounds are normal  She exhibits no distension  There is no tenderness  There is no guarding  Musculoskeletal: She exhibits no edema  Loss of generalized muscle mass   Lymphadenopathy:     She has no cervical adenopathy  Neurological: She is alert and oriented to person, place, and time  Speech clear and appropriate  Skin: Skin is warm and dry  She is not diaphoretic  Psychiatric: She has a normal mood and affect  Her behavior is normal  Thought content normal    Nursing note and vitals reviewed      Assessment/Plan:  8-year-old female with:    Closed T11 fracture  -was seen by neurosurgery today in office, recommendations noted  -topical heat ordered prior to therapy such as Edmund-Joyner and after therapy  -continue PT/OT  -okay to not use back brace as per Neurosurgery  -has no pain at rest, has pain when sitting up upright in bed    Acute pain due to trauma  -continue oxycodone IR, scheduled acetaminophen, Neurontin as ordered  -continue Aspercreme lidocaine patch is ordered    Age-related osteoporosis with current pathological fracture with routine healing  -management as above    Hypertension  -SBPs trending 110s  -discontinue amlodipine to avoid hypotension  -continue Lasix 20 mg as ordered weekly    Physical deconditioning/debility/ambulatory dysfunction/frailty  -in setting of above  -continue supportive care at SNF for ADLs  -continue PT/OT  -continue fall precautions  -continue to provide nutritional support  -skin breakdown prevention measures    **Please note: This follow-up note was constructed using a voice recognition system  [address] [place], [address] [place]  3/04/47798:36 PM

## 2020-02-07 ENCOUNTER — NURSING HOME VISIT (OUTPATIENT)
Dept: GERIATRICS | Facility: OTHER | Age: 85
End: 2020-02-07
Payer: MEDICARE

## 2020-02-07 DIAGNOSIS — S22.081D CLOSED STABLE BURST FRACTURE OF ELEVENTH THORACIC VERTEBRA WITH ROUTINE HEALING, SUBSEQUENT ENCOUNTER: ICD-10-CM

## 2020-02-07 DIAGNOSIS — R53.81 DEBILITY: ICD-10-CM

## 2020-02-07 DIAGNOSIS — E87.1 HYPONATREMIA: ICD-10-CM

## 2020-02-07 DIAGNOSIS — G89.11 ACUTE PAIN DUE TO TRAUMA: Primary | ICD-10-CM

## 2020-02-07 PROCEDURE — 99309 SBSQ NF CARE MODERATE MDM 30: CPT | Performed by: FAMILY MEDICINE

## 2020-02-07 NOTE — PROGRESS NOTES
[facility]   [address] [facility], [place], [address]  Progress Note  POS: SNF- 31    Unable to obtain from patient due to: History obtained from patient at bedside  Chief Complaint/Reason for visit: Follow up pain in ribs, debility, dysphagia  History of Present Illness: 8 year old female seen for STR follow up; follow up of pain in left rib area; current regimen includes scheduled acetaminophen, lidoderm patch, gabapentin TID, OxyIR 2 5mg PRN- patient notes pain is exacerbated when sitting in upright position for meals and with therapy; relieved with laying down  She notes improvement in function since AM OxyIR dose given between 6-630AM to allow time for it to "kick in" before activity; she is using additional PRN dosing 2-3 times per day  Denies shortness of breath, cough; no fevers or chills  Speech following; had been on modified diet and required feeding assistance by private aide due to inability to sit in upright position  Spoke with son, Sophy Tavares, via telephone per his request- this was confirmed ok to do by patient  He expressed concerns over pain medications and wanted to avoid potentially adverse effects from taking them regularly; plan of care reviewed and all questions answered  Past Medical History: unchanged from history and physical  Past Medical History:   Diagnosis Date    Cataract     Disease of thyroid gland     Glaucoma     HL (hearing loss)     Wears hearing aids    Hypertension     PVD [facility]peripheral vascular disease) ([facility] [address] )     Tinnitus      Family History: unchanged from history and physical  Social History: unchanged from history and physical  Resident Since: STR- January 2020  Review of systems: Review of Systems   Constitutional: Positive for fatigue  Negative for chills, fever and unexpected weight change  HENT: Positive for trouble swallowing (related to positioning)  Respiratory: Negative for cough, choking, chest tightness and shortness of breath      Cardiovascular: Negative for chest pain and leg swelling  Gastrointestinal: Negative for abdominal pain, constipation and nausea  Genitourinary: Negative for difficulty urinating  Musculoskeletal: Positive for arthralgias  Skin: Negative for rash  Neurological: Negative for dizziness and light-headedness  Psychiatric/Behavioral: Negative for sleep disturbance (patient states she is sleeping well at night)  All other systems reviewed and are negative  Medications: Changes made- see written orders   Aspirin 81mg 3x/week  Acetaminophen 650mg TID  Gabapentin 100mg TID  Combigan one drop both eyes BID  Aspercreme 4% to left ribs daily  Colace 100mg BID  BComplex-Vitamin C daily  Biotin 5mg daily  Calcium + Vitamin D daily  Levothyroxine 88mcg daily  Miralax 17g daily  Vitamin D3 2000 IU daily  CoQ10 100mg daily  MVI daily  Melatonin 3mg QHS  OxyIR 8 2NL in AM  Bystolic 5mg daily  Amlodipine 5mg daily- stopped 1/31  Lasix 20mg daily    PRN: acetaminophen, MoM, dulcolax, fleet enema, maalox, bisacodyl, bentyl, ventolin, OxyIR 2 5mg Q4h PRN    Allergies: NKDA  Consults reviewed:PT and OT  Labs/Diagnostics (reviewed by this provider): Copy in Chart (1/28/20)  BMP: Na:143 K:4 5 Cl:104 CO:35 BUN:27 Cr:0 87 Glu:101 Ca:9 2    Physical Exam    Weight: 106 2lb (104 2<109 4) Temp:96 9F BP:120/80  Pulse:70 Resp:18 O2 Sat:95% RA  Constitutional: Well-nourished and Normocephalic  Orientation:Person and Place, day, situation     Physical Exam   Constitutional: She is oriented to person, place, and time  No distress  HENT:   Head: Normocephalic and atraumatic  Mouth/Throat: Oropharynx is clear and moist  No oropharyngeal exudate  Eyes: Conjunctivae are normal  Right eye exhibits no discharge  Left eye exhibits no discharge  No scleral icterus  Neck: Normal range of motion  Neck supple  Cardiovascular: Normal rate and regular rhythm  Pulmonary/Chest: Effort normal  No respiratory distress  She has no wheezes     Decreased breath sounds bilateral bases   Abdominal: Soft  Bowel sounds are normal    Musculoskeletal: She exhibits no edema or tenderness  Neurological: She is alert and oriented to person, place, and time  Skin: Skin is warm and dry  She is not diaphoretic  Psychiatric: She has a normal mood and affect  Nursing note and vitals reviewed      Assessment/Plan:  8 year old female with:    Acute pain due to trauma  In setting of falls; acute T11 fracture  Continue Aspercreme 4% lidocaine patch to left painful ribs daily- on 12h off 12h  Continue Acetaminophen 650mg TID; max 3g/24h  Scheduled OxyIR 2 5mg TID 30-60min before meals (0600, 1100, 1600); hold for sedation, RR<10, SBP<100, HR<55; additional 2 5mg BID PRN severe pain available; scripts written for #20 each, no RF; PDMP reviewed  Continue gabapentin 100mg TID    Closed T11 fracture (HCC)  S/p fall  Evaluated by neurosurgery inpatient; nonoperative management, no bracing ordered  Pain control as outlined     Hyponatremia  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

## 2020-02-10 NOTE — ASSESSMENT & PLAN NOTE
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

## 2020-02-10 NOTE — ASSESSMENT & PLAN NOTE
In setting of falls; acute T11 fracture  Continue Aspercreme 4% lidocaine patch to left painful ribs daily- on 12h off 12h  Continue Acetaminophen 650mg TID; max 3g/24h  Scheduled OxyIR 2 5mg TID 30-60min before meals (0600, 1100, 1600); hold for sedation, RR<10, SBP<100, HR<55; additional 2 5mg BID PRN severe pain available; scripts written for #20 each, no RF; PDMP reviewed  Continue gabapentin 100mg TID

## 2020-02-10 NOTE — ASSESSMENT & PLAN NOTE
S/p fall  Evaluated by neurosurgery inpatient; nonoperative management, no bracing ordered  Pain control as outlined

## 2020-02-10 NOTE — ASSESSMENT & PLAN NOTE
Multifactorial  Continue SNF rehab  Continue PT/OT  Supportive care, nutritional support, ADL support

## 2020-02-13 ENCOUNTER — NURSING HOME VISIT (OUTPATIENT)
Dept: GERIATRICS | Facility: OTHER | Age: 85
End: 2020-02-13
Payer: MEDICARE

## 2020-02-13 DIAGNOSIS — E87.1 HYPONATREMIA: ICD-10-CM

## 2020-02-13 DIAGNOSIS — R54 FRAILTY SYNDROME IN GERIATRIC PATIENT: ICD-10-CM

## 2020-02-13 DIAGNOSIS — R26.2 AMBULATORY DYSFUNCTION: ICD-10-CM

## 2020-02-13 DIAGNOSIS — G89.11 ACUTE PAIN DUE TO TRAUMA: Primary | ICD-10-CM

## 2020-02-13 DIAGNOSIS — S22.081D CLOSED STABLE BURST FRACTURE OF ELEVENTH THORACIC VERTEBRA WITH ROUTINE HEALING, SUBSEQUENT ENCOUNTER: ICD-10-CM

## 2020-02-13 DIAGNOSIS — K59.00 CONSTIPATION, UNSPECIFIED CONSTIPATION TYPE: ICD-10-CM

## 2020-02-13 DIAGNOSIS — R53.81 DEBILITY: ICD-10-CM

## 2020-02-13 PROCEDURE — 99309 SBSQ NF CARE MODERATE MDM 30: CPT | Performed by: NURSE PRACTITIONER

## 2020-02-13 NOTE — PROGRESS NOTES
[facility]   [address] [place] [address], [place], [address]  [id number]  STR Note    Chief Complaint/Reason for visit: STR Follow up- acute pain due to trauma; closed T11 fracture; hyponatremia; debility; frailty; ambulatory dysfunction; constipation  History of Present Illness: 8-year-old female seen and examined for STR follow-up  Patient was found lying in bed at 30 degree angle eating lunch  Patient has been informed by nursing numerous times that she is high risk for aspiration when eating lying down  Continues with low back pain and bilateral hip pain at times  Patient participates in therapy, however her performance fluctuates due to fatigue, dizziness, and pain  Per therapy notes, patient needs encouragement to do PT/get out of bed at times  Denies shortness of breath, chest pain, headache, dizziness, abdominal pain, nausea, vomiting, diarrhea, or constipation  Patient states that back and bilateral hip pain pain fluctuates daily  Past Medical History: unchanged from history and physical  Past Medical History:   Diagnosis Date    Cataract     Disease of thyroid gland     Glaucoma     HL (hearing loss)     Wears hearing aids    Hypertension     PVD (peripheral vascular disease) ([facility] [address] )     Tinnitus      Family History: unchanged from history and physical  Social History: unchanged from history and physical  Resident Since:  01/18/2020  Review of systems: Review of Systems   Constitutional: Negative for appetite change, chills, diaphoresis and fatigue  HENT: Negative  Eyes: Negative  Respiratory: Negative  Cardiovascular: Negative for chest pain and palpitations  Gastrointestinal: Negative  Genitourinary: Negative  Musculoskeletal: Positive for arthralgias, back pain and gait problem  Skin: Negative  Neurological: Negative for dizziness, syncope, light-headedness and headaches  Psychiatric/Behavioral: Negative  All other systems reviewed and are negative      Medications: All medication orders were reviewed at facility EMR  No changes made  Allergies: Reviewed and unchanged  Consults reviewed:PT, OT and Other  Labs/Diagnostics (reviewed by this provider): Copy in Chart    Imaging Reviewed:  None today  Physical Exam    Weight:  105 8 lb Temp:  97 8 BP:  125/60 Pulse:  63     Resp:  14    O2 Sat:  91% on room air  Constitutional: Normocephalic  Orientation:Person, Place and Day     Physical Exam   Constitutional: She is oriented to person, place, and time  She appears well-developed  No distress  HENT:   Head: Normocephalic  Mouth/Throat: Oropharynx is clear and moist  No oropharyngeal exudate  Eyes: Pupils are equal, round, and reactive to light  EOM are normal  Right eye exhibits no discharge  Left eye exhibits no discharge  Neck: Neck supple  No JVD present  Cardiovascular: Normal rate, regular rhythm and normal heart sounds  Pulmonary/Chest: Effort normal and breath sounds normal  No respiratory distress  She has no wheezes  She has no rales  Abdominal: Soft  Bowel sounds are normal  She exhibits no distension  There is no tenderness  There is no guarding  Musculoskeletal: She exhibits no edema  Able to move all 4 extremities  Lymphadenopathy:     She has no cervical adenopathy  Neurological: She is alert and oriented to person, place, and time  Skin: Skin is warm and dry  Capillary refill takes less than 2 seconds  She is not diaphoretic  Psychiatric: She has a normal mood and affect  Her behavior is normal  Thought content normal    Nursing note and vitals reviewed  Assessment/Plan:  8-year-old female with:    Acute pain due to trauma  -due to acute T11 fracture from fall  -patient states that she has are good days and bad days with pain  Able to sit out of bed longer and participate in therapy  -continue scheduled Oxy-IR, scheduled acetaminophen, Aspercreme/lidocaine patch, gabapentin t i d   As ordered    Closed T11 fracture  -status post fall  -was evaluated by neuro surgery on 02/03/2020, back brace not needed  -follow up with Neurosurgery as needed    Hyponatremia  -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

## 2020-02-20 ENCOUNTER — NURSING HOME VISIT (OUTPATIENT)
Dept: GERIATRICS | Facility: OTHER | Age: 85
End: 2020-02-20
Payer: MEDICARE

## 2020-02-20 DIAGNOSIS — R91.8 MASS OF RIGHT LUNG: Primary | ICD-10-CM

## 2020-02-20 DIAGNOSIS — E46 PROTEIN-CALORIE MALNUTRITION, UNSPECIFIED SEVERITY (HCC): ICD-10-CM

## 2020-02-20 DIAGNOSIS — S22.081G CLOSED STABLE BURST FRACTURE OF ELEVENTH THORACIC VERTEBRA WITH DELAYED HEALING, SUBSEQUENT ENCOUNTER: ICD-10-CM

## 2020-02-20 DIAGNOSIS — G89.11 ACUTE PAIN DUE TO TRAUMA: ICD-10-CM

## 2020-02-20 DIAGNOSIS — Z71.89 GOALS OF CARE, COUNSELING/DISCUSSION: ICD-10-CM

## 2020-02-20 DIAGNOSIS — I10 BENIGN ESSENTIAL HYPERTENSION: ICD-10-CM

## 2020-02-20 PROCEDURE — 99310 SBSQ NF CARE HIGH MDM 45: CPT | Performed by: FAMILY MEDICINE

## 2020-02-20 NOTE — PROGRESS NOTES
[facility]   [address] [place] [address], [place], [address]  Progress Note  POS: Nursing Facility/LTC-32    Unable to obtain from patient due to:  N/A; history obtained from patient along with caretaker, El Apgar, at bedside; additional history obtained from son, Sophy Tavares ([phone]), via telephone  Chief Complaint/Reason for visit: Follow up chronic medical conditions- pain, lung mass, thoracic fracture  History of Present Illness: 8 year old female seen for follow up of chronic medical conditions and goals of care discussion; has converted to long term care  Noted to have variable blood pressures; amlodipine was previously stopped; continues on bystolic, along with weekly lasix  Weight has been down trending  Patient continues with poor appetite related to pain and difficulty positioning herself to eat due to pain  Continues on scheduled OxyIR 2 5mg TID + PRN along with gabapentin, tylenol, topical lidoderm  OxyIR needs to be held at times due to hypotension; no excess sedation noted  Patient inquires as to why her thoracic fracture is not healing and still painful  Imaging obtained in hospital rereviwed by myself  T11 fracture with associated sclerotic lesion- this likely is metastatic disease related to lung mass; patient does not want biopsy, further workup of treatment for the lung mass; she is a former smoker  These findings were reviewed with patient, caretaker and son; relayed that in my opinion, given the spiculated appearance of the lung mass it is likely cancer and the sclerotic lesion at T11 is likely metastatic disease in this setting  Recommended hospice care to patient to improve her pain control and quality of life in setting of advanced age and due to the fact that she does not want to pursue further workup or treatment  Reviewed no role for repeat imaging at this time as palliative and comfort measures are goal  Son inquired to life expectancy; relayed likely weeks to months   Patient and family in agreement to hospice referral which was made to [facility]; discussed with social work and nursing  Past Medical History: unchanged from history and physical  Past Medical History:   Diagnosis Date    Cataract     Disease of thyroid gland     Glaucoma     HL (hearing loss)     Wears hearing aids    Hypertension     PVD (peripheral vascular disease) ([address] )     Tinnitus      Family History: unchanged from history and physical  Social History: unchanged from history and physical  Resident Since: January 2020  Review of systems: Review of Systems   Constitutional: Positive for activity change and appetite change  Negative for chills, fatigue and unexpected weight change  HENT: Positive for trouble swallowing (due to positioning)  Respiratory: Negative for cough, chest tightness and shortness of breath  Cardiovascular: Negative for leg swelling  Gastrointestinal: Negative for abdominal pain, constipation and nausea  Musculoskeletal: Positive for back pain  Neurological: Negative for dizziness and light-headedness  Psychiatric/Behavioral: Negative for sleep disturbance  All other systems reviewed and are negative      Medications: No changes made   Miralax 17g PO daily  OxyIR 2 5mg TID (0600, 1100, 1600) and BID PRN severe pain; hold for HR<55, SP<100, RR<10, sedation  Aspirin 81mg 3x/week  Acetaminophen 650mg TID  Gabapentin 100mg TID  Combigan 0 2%-0 5% one drop both eyes BID  Aspercreme 4% patch to left ribs daily  Colace 100mg BID  BComplex + Vitamin C daily  Biotin 5mg daily  Calcium + Vitamin D daily  Levothyroxine 88mcg daily  Vitamin D3 2000 IU daily  CoQ10 100mg daily  MVI daily  Melatonin 3mg QHS  Bystolic 5mg daily, UUY<531  Lasix 20mg weekly, hold for SBP<105    PRN: acetaminophen, MoM, dulcolax, fleet enema, maalox, bisacodyl, bentyl, ventolin    Dysphagia 1 puree diet    Allergies: NKDA  Consults reviewed:Psychology, Podiatry and Other- Neurosurgery  Labs/Diagnostics (reviewed by this provider): Copy in Chart  CBC: (2/14/20)Hb:11 6 Hct:33 7 WBC:12 3 (9 0) PLT:345  BMP: (2/14/20)Na:136 K:4 6 Cl:100 CO:28 BUN:19 Cr:0 67 Glu:95 Ca:8 8    Imaging Reviewed:  CT Scan: Abdomen/pelvis- (1/8/20) RML spiculated mass; T11 sclerotic lesion with pathologic fracture    Physical Exam    Weight: 104 4lb (105 8<106 2<109 4) Temp:97 6F BP:133/69 (-81/49)  Pulse:68 (58-) Resp:18 O2 Sat:91% RA  Constitutional: Normocephalic and Pallor  Orientation:Person, Place and Month, situation     Physical Exam   Constitutional: She is oriented to person, place, and time  Thin, frail elderly female   HENT:   Head: Normocephalic and atraumatic  Mouth/Throat: No oropharyngeal exudate  Lips dry   Eyes: Conjunctivae are normal  Right eye exhibits no discharge  Left eye exhibits no discharge  No scleral icterus  Neck: Normal range of motion  Neck supple  Cardiovascular: Normal rate and regular rhythm  Pulmonary/Chest: Effort normal  No respiratory distress  Decreased breath sounds b/l bases   Abdominal: Soft  Bowel sounds are normal    Musculoskeletal: She exhibits tenderness  She exhibits no edema  Neurological: She is alert and oriented to person, place, and time  No cranial nerve deficit  Skin: Skin is warm and dry  She is not diaphoretic  There is pallor  Psychiatric: She has a normal mood and affect  Nursing note and vitals reviewed  Assessment/Plan:  8 year old female with:     Mass of right lung  Patient declines further workup  +smoking history  Suspected malignancy based on CT imaging appearance and in setting of hyponatremia and associated T11 sclerotic lesion- likely metastatic disease    Closed T11 fracture (HCC)  S/p fall; with associated sclerotic lesion, suspect metastatic lung cancer  Evaluated by neurosurgery inpatient; nonoperative management, no bracing ordered  Pain control as outlined   ?consider decadron trial for pain control    Acute pain due to trauma  In setting of falls; acute T11 fracture and rb fracture  Continue Aspercreme 4% lidocaine patch to left painful ribs daily- on 12h off 12h  Continue Acetaminophen 650mg TID; max 3g/24h  Continue scheduled OxyIR 2 5mg TID 30-60min before meals (0600, 1100, 1600); hold for sedation, RR<10, SBP<100, HR<55; additional 2 5mg BID PRN severe pain  Continue gabapentin 100mg TID    Protein calorie malnutrition ([address] )  In setting of suspected malignancy  With progressive weight loss, decrease in muscle mass, temporal wasting  Dietary/nutriton consult and supplementation  Pain control as outlined      Goals of care, counseling/discussion  See discussion as per HPI  In setting of likely lung cancer with T11 metastasis, progressive weight loss- referral placed to [address] [place] [address] essential hypertension  Goal <150/90  Continues on bystolic, amlodipine previously discontinued; hold for SBP<110, consider complete d/c  D/c lasix in setting of poor PO intake, weight loss      Domi Mehta, DO  2/20/20

## 2020-02-21 PROBLEM — E46 PROTEIN CALORIE MALNUTRITION (HCC): Status: ACTIVE | Noted: 2020-02-21

## 2020-02-21 NOTE — ASSESSMENT & PLAN NOTE
Patient declines further workup  +smoking history  Suspected malignancy based on CT imaging appearance and in setting of hyponatremia and associated T11 sclerotic lesion- likely metastatic disease

## 2020-02-21 NOTE — ASSESSMENT & PLAN NOTE
Goal <150/90  Continues on bystolic, amlodipine previously discontinued; hold for SBP<110, consider complete d/c  D/c lasix in setting of poor PO intake, weight loss

## 2020-02-21 NOTE — ASSESSMENT & PLAN NOTE
See discussion as per HPI  In setting of likely lung cancer with T11 metastasis, progressive weight loss- referral placed to [address] [place] [address]

## 2020-02-21 NOTE — ASSESSMENT & PLAN NOTE
In setting of suspected malignancy  With progressive weight loss, decrease in muscle mass, temporal wasting  Dietary/nutriton consult and supplementation  Pain control as outlined

## 2020-02-21 NOTE — ASSESSMENT & PLAN NOTE
S/p fall; with associated sclerotic lesion, suspect metastatic lung cancer  Evaluated by neurosurgery inpatient; nonoperative management, no bracing ordered  Pain control as outlined   ?consider decadron trial for pain control

## 2020-02-21 NOTE — ASSESSMENT & PLAN NOTE
In setting of falls; acute T11 fracture and rb fracture  Continue Aspercreme 4% lidocaine patch to left painful ribs daily- on 12h off 12h  Continue Acetaminophen 650mg TID; max 3g/24h  Continue scheduled OxyIR 2 5mg TID 30-60min before meals (0600, 1100, 1600); hold for sedation, RR<10, SBP<100, HR<55; additional 2 5mg BID PRN severe pain  Continue gabapentin 100mg TID

## 2020-03-08 ENCOUNTER — TELEPHONE (OUTPATIENT)
Dept: OTHER | Facility: OTHER | Age: 85
End: 2020-03-08

## 2020-03-08 NOTE — TELEPHONE ENCOUNTER
[facility] is calling to inform Dr Anjelica Quintero passed away  2021   Was TT via [address] [place] to [facility]